# Patient Record
Sex: FEMALE | Race: WHITE | URBAN - METROPOLITAN AREA
[De-identification: names, ages, dates, MRNs, and addresses within clinical notes are randomized per-mention and may not be internally consistent; named-entity substitution may affect disease eponyms.]

---

## 2022-06-17 ENCOUNTER — OFFICE (OUTPATIENT)
Dept: URBAN - METROPOLITAN AREA CLINIC 76 | Facility: CLINIC | Age: 47
Setting detail: OPHTHALMOLOGY
End: 2022-06-17
Payer: COMMERCIAL

## 2022-06-17 DIAGNOSIS — L40.50: ICD-10-CM

## 2022-06-17 DIAGNOSIS — H25.041: ICD-10-CM

## 2022-06-17 DIAGNOSIS — Z79.52: ICD-10-CM

## 2022-06-17 DIAGNOSIS — D35.2: ICD-10-CM

## 2022-06-17 PROCEDURE — 92083 EXTENDED VISUAL FIELD XM: CPT | Performed by: STUDENT IN AN ORGANIZED HEALTH CARE EDUCATION/TRAINING PROGRAM

## 2022-06-17 PROCEDURE — 99204 OFFICE O/P NEW MOD 45 MIN: CPT | Performed by: STUDENT IN AN ORGANIZED HEALTH CARE EDUCATION/TRAINING PROGRAM

## 2022-06-17 PROCEDURE — 92133 CPTRZD OPH DX IMG PST SGM ON: CPT | Performed by: STUDENT IN AN ORGANIZED HEALTH CARE EDUCATION/TRAINING PROGRAM

## 2022-06-17 ASSESSMENT — REFRACTION_CURRENTRX
OS_OVR_VA: 20/
OD_OVR_VA: 20/
OD_SPHERE: -11.25
OD_AXIS: 21
OS_AXIS: 159
OS_SPHERE: -10.25
OS_CYLINDER: -2.25
OD_CYLINDER: -2.00

## 2022-06-17 ASSESSMENT — KERATOMETRY
OS_AXISANGLE_DEGREES: 77
OD_K2POWER_DIOPTERS: 45.75
OD_AXISANGLE_DEGREES: 99
OS_K2POWER_DIOPTERS: 45.50
OD_K1POWER_DIOPTERS: 42.50
OS_K1POWER_DIOPTERS: 42.75

## 2022-06-17 ASSESSMENT — AXIALLENGTH_DERIVED
OD_AL: 29.9851
OS_AL: 28.7048
OD_AL: 29.2185
OS_AL: 29.75

## 2022-06-17 ASSESSMENT — REFRACTION_MANIFEST
OS_SPHERE: -10.25
OS_VA1: 20/30-
OD_VA1: 20/30-
OD_CYLINDER: -2.00
OS_AXIS: 160
OD_SPHERE: -11.25
OS_CYLINDER: -2.25
OD_AXIS: 20

## 2022-06-17 ASSESSMENT — SPHEQUIV_DERIVED
OS_SPHEQUIV: -13.125
OS_SPHEQUIV: -11.375
OD_SPHEQUIV: -12.25
OD_SPHEQUIV: -13.5

## 2022-06-17 ASSESSMENT — VISUAL ACUITY
OS_BCVA: 20/30-1
OD_BCVA: 20/30-2

## 2022-06-17 ASSESSMENT — REFRACTION_AUTOREFRACTION
OS_CYLINDER: -3.25
OD_SPHERE: -11.25
OS_SPHERE: -11.50
OD_AXIS: 13
OD_CYLINDER: -4.50
OS_AXIS: 162

## 2022-06-17 ASSESSMENT — CONFRONTATIONAL VISUAL FIELD TEST (CVF)
OS_FINDINGS: FULL
OD_FINDINGS: FULL

## 2022-07-12 PROBLEM — D35.2 PITUITARY TUMOR BENIGN: Status: ACTIVE | Noted: 2022-06-17

## 2022-07-12 PROBLEM — L40.50 ARTHROPATHIC PSORIASIS, UNSPECIFIED: Status: ACTIVE | Noted: 2022-06-17

## 2022-07-12 PROBLEM — H25.041 POSTERIOR SUBCAPSULAR POLAR CATARACT; RIGHT EYE: Status: ACTIVE | Noted: 2022-06-17

## 2023-03-27 PROBLEM — Z00.00 ENCOUNTER FOR PREVENTIVE HEALTH EXAMINATION: Status: ACTIVE | Noted: 2023-03-27

## 2023-05-23 ENCOUNTER — APPOINTMENT (OUTPATIENT)
Dept: OBGYN | Facility: CLINIC | Age: 48
End: 2023-05-23

## 2023-08-16 ENCOUNTER — APPOINTMENT (OUTPATIENT)
Dept: OBGYN | Facility: CLINIC | Age: 48
End: 2023-08-16
Payer: COMMERCIAL

## 2023-08-16 VITALS
SYSTOLIC BLOOD PRESSURE: 152 MMHG | BODY MASS INDEX: 25.77 KG/M2 | DIASTOLIC BLOOD PRESSURE: 81 MMHG | WEIGHT: 180 LBS | HEIGHT: 70 IN

## 2023-08-16 DIAGNOSIS — Z86.018 PERSONAL HISTORY OF OTHER BENIGN NEOPLASM: ICD-10-CM

## 2023-08-16 DIAGNOSIS — N93.8 OTHER SPECIFIED ABNORMAL UTERINE AND VAGINAL BLEEDING: ICD-10-CM

## 2023-08-16 DIAGNOSIS — Z87.2 PERSONAL HISTORY OF DISEASES OF THE SKIN AND SUBCUTANEOUS TISSUE: ICD-10-CM

## 2023-08-16 DIAGNOSIS — Z01.419 ENCOUNTER FOR GYNECOLOGICAL EXAMINATION (GENERAL) (ROUTINE) W/OUT ABNORMAL FINDINGS: ICD-10-CM

## 2023-08-16 LAB
BILIRUB UR QL STRIP: NORMAL
GLUCOSE UR-MCNC: NORMAL
HCG UR QL: 0.2 EU/DL
HCG UR QL: NEGATIVE
HGB UR QL STRIP.AUTO: NORMAL
KETONES UR-MCNC: NORMAL
LEUKOCYTE ESTERASE UR QL STRIP: NORMAL
NITRITE UR QL STRIP: NORMAL
PH UR STRIP: 6
PROT UR STRIP-MCNC: NORMAL
SP GR UR STRIP: 1.02

## 2023-08-16 PROCEDURE — 36415 COLL VENOUS BLD VENIPUNCTURE: CPT

## 2023-08-16 PROCEDURE — 99202 OFFICE O/P NEW SF 15 MIN: CPT | Mod: 25

## 2023-08-16 PROCEDURE — 99386 PREV VISIT NEW AGE 40-64: CPT

## 2023-08-16 PROCEDURE — 81025 URINE PREGNANCY TEST: CPT

## 2023-08-16 PROCEDURE — 81003 URINALYSIS AUTO W/O SCOPE: CPT | Mod: QW

## 2023-08-16 RX ORDER — SECUKINUMAB 75 MG/.5ML
INJECTION SUBCUTANEOUS
Refills: 0 | Status: ACTIVE | COMMUNITY

## 2023-08-16 RX ORDER — PREDNISONE 10 MG/1
TABLET ORAL
Refills: 0 | Status: ACTIVE | COMMUNITY

## 2023-08-16 RX ORDER — SERTRALINE HYDROCHLORIDE 100 MG/1
100 TABLET, FILM COATED ORAL
Refills: 0 | Status: ACTIVE | COMMUNITY

## 2023-08-16 RX ORDER — OLMESARTAN MEDOXOMIL 40 MG/1
TABLET, FILM COATED ORAL
Refills: 0 | Status: ACTIVE | COMMUNITY

## 2023-08-16 RX ORDER — CABERGOLINE 0.5 MG/1
TABLET ORAL
Refills: 0 | Status: ACTIVE | COMMUNITY

## 2023-08-16 NOTE — PLAN
[FreeTextEntry1] : pap done TVS and blood work will f/u- if has another episode of irreg bleeding for rpt emb

## 2023-08-16 NOTE — PHYSICAL EXAM
[Chaperone Present] : A chaperone was present in the examining room during all aspects of the physical examination [Appropriately responsive] : appropriately responsive [Alert] : alert [No Acute Distress] : no acute distress [Soft] : soft [Non-tender] : non-tender [Non-distended] : non-distended [No HSM] : No HSM [No Lesions] : no lesions [No Mass] : no mass [Oriented x3] : oriented x3 [Examination Of The Breasts] : a normal appearance [No Masses] : no breast masses were palpable [Labia Majora] : normal [Labia Minora] : normal [Normal] : normal [Uterine Adnexae] : normal [Normal rectal exam] : was normal [Normal Brown Stool] : was normal and brown

## 2023-08-16 NOTE — HISTORY OF PRESENT ILLNESS
[FreeTextEntry1] : 48 yo p0000 longstanding psoriatic arthritis followed by rheum Dr. 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 she was on. Followed by Derm  hx of pituitary adenoma-with resection 2005. last few years prolactin levels were rising and she was restarted on cabergoline-endo Dr. Alexa nelson has f/u endo 8/29/23 additionally on zoloft and Olmesartan. up to date mammo and colonoscopy fh mother ovarian CA - pt did brca testing and negative Sexually active-female partner had 2 bleeding epsiodes this month had similar in 2021- neg emb

## 2023-08-17 LAB
C TRACH RRNA SPEC QL NAA+PROBE: NOT DETECTED
ESTRADIOL SERPL-MCNC: 164 PG/ML
FSH SERPL-MCNC: 6.7 IU/L
HPV HIGH+LOW RISK DNA PNL CVX: NOT DETECTED
LH SERPL-ACNC: 9.1 IU/L
N GONORRHOEA RRNA SPEC QL NAA+PROBE: NOT DETECTED
PROLACTIN SERPL-MCNC: 6.3 NG/ML
SOURCE TP AMPLIFICATION: NORMAL
TSH SERPL-ACNC: 1.38 UIU/ML

## 2023-08-21 LAB — CYTOLOGY CVX/VAG DOC THIN PREP: NORMAL

## 2023-10-19 ENCOUNTER — NON-APPOINTMENT (OUTPATIENT)
Age: 48
End: 2023-10-19

## 2023-10-19 DIAGNOSIS — N83.209 UNSPECIFIED OVARIAN CYST, UNSPECIFIED SIDE: ICD-10-CM

## 2023-10-25 ENCOUNTER — APPOINTMENT (OUTPATIENT)
Dept: OBGYN | Facility: CLINIC | Age: 48
End: 2023-10-25

## 2023-10-25 ENCOUNTER — NON-APPOINTMENT (OUTPATIENT)
Age: 48
End: 2023-10-25

## 2024-08-22 ENCOUNTER — APPOINTMENT (OUTPATIENT)
Dept: OBGYN | Facility: CLINIC | Age: 49
End: 2024-08-22

## 2024-08-29 ENCOUNTER — APPOINTMENT (OUTPATIENT)
Dept: OBGYN | Facility: CLINIC | Age: 49
End: 2024-08-29
Payer: COMMERCIAL

## 2024-08-29 VITALS
WEIGHT: 173 LBS | BODY MASS INDEX: 24.77 KG/M2 | DIASTOLIC BLOOD PRESSURE: 64 MMHG | SYSTOLIC BLOOD PRESSURE: 100 MMHG | HEIGHT: 70 IN

## 2024-08-29 DIAGNOSIS — Z87.42 PERSONAL HISTORY OF OTHER DISEASES OF THE FEMALE GENITAL TRACT: ICD-10-CM

## 2024-08-29 DIAGNOSIS — Z01.419 ENCOUNTER FOR GYNECOLOGICAL EXAMINATION (GENERAL) (ROUTINE) W/OUT ABNORMAL FINDINGS: ICD-10-CM

## 2024-08-29 PROCEDURE — 99212 OFFICE O/P EST SF 10 MIN: CPT | Mod: 25

## 2024-08-29 PROCEDURE — 99459 PELVIC EXAMINATION: CPT

## 2024-08-29 PROCEDURE — 99396 PREV VISIT EST AGE 40-64: CPT | Mod: 25

## 2024-08-29 PROCEDURE — 76830 TRANSVAGINAL US NON-OB: CPT

## 2024-08-29 RX ORDER — AMLODIPINE BESYLATE 5 MG/1
TABLET ORAL
Refills: 0 | Status: ACTIVE | COMMUNITY

## 2024-08-29 RX ORDER — SECUKINUMAB 25 MG/ML
INJECTION, SOLUTION, CONCENTRATE INTRAVENOUS
Refills: 0 | Status: ACTIVE | COMMUNITY

## 2024-08-29 NOTE — HISTORY OF PRESENT ILLNESS
[FreeTextEntry1] : 47 yo g0 med hx-psoriatic arthritis on prednisone and cosentyx followed by rheum hx of melanoma followed by derm, attributed to immunosuppressives she was on hx of pituitary adenoma with resection-2005- followed endo-on cabergoline htn on amlodipine also takes zoloft lmp july, no period in august scheduled for breast reduction in September up to date mammo up to date colonoscopy fh -ovarian ca-mother and a half sibling pt did brca testing in past and negative last pap 8/23

## 2024-08-30 LAB
C TRACH RRNA SPEC QL NAA+PROBE: NOT DETECTED
N GONORRHOEA RRNA SPEC QL NAA+PROBE: NOT DETECTED
SOURCE AMPLIFICATION: NORMAL